# Patient Record
Sex: FEMALE | Race: ASIAN | NOT HISPANIC OR LATINO | URBAN - METROPOLITAN AREA
[De-identification: names, ages, dates, MRNs, and addresses within clinical notes are randomized per-mention and may not be internally consistent; named-entity substitution may affect disease eponyms.]

---

## 2022-05-05 ENCOUNTER — WEB ENCOUNTER (OUTPATIENT)
Dept: URBAN - METROPOLITAN AREA CLINIC 128 | Facility: CLINIC | Age: 67
End: 2022-05-05

## 2022-05-05 ENCOUNTER — LAB OUTSIDE AN ENCOUNTER (OUTPATIENT)
Dept: URBAN - METROPOLITAN AREA CLINIC 19 | Facility: CLINIC | Age: 67
End: 2022-05-05

## 2022-05-05 ENCOUNTER — DASHBOARD ENCOUNTERS (OUTPATIENT)
Age: 67
End: 2022-05-05

## 2022-05-05 ENCOUNTER — OFFICE VISIT (OUTPATIENT)
Dept: URBAN - METROPOLITAN AREA CLINIC 128 | Facility: CLINIC | Age: 67
End: 2022-05-05
Payer: MEDICARE

## 2022-05-05 DIAGNOSIS — R74.8 ELEVATED ALKALINE PHOSPHATASE LEVEL: ICD-10-CM

## 2022-05-05 DIAGNOSIS — Z86.010 HISTORY OF COLON POLYPS: ICD-10-CM

## 2022-05-05 DIAGNOSIS — R74.01 ELEVATED ALT MEASUREMENT: ICD-10-CM

## 2022-05-05 PROBLEM — 274770006 ALKALINE PHOSPHATASE RAISED: Status: ACTIVE | Noted: 2022-05-05

## 2022-05-05 PROBLEM — 166669000: Status: ACTIVE | Noted: 2022-05-05

## 2022-05-05 PROCEDURE — 99243 OFF/OP CNSLTJ NEW/EST LOW 30: CPT | Performed by: INTERNAL MEDICINE

## 2022-05-05 RX ORDER — ASPIRIN 81 MG/1
1 TABLET TABLET, COATED ORAL ONCE A DAY
Status: ACTIVE | COMMUNITY

## 2022-05-05 RX ORDER — MULTIVITAMIN
1 TABLET TABLET ORAL ONCE A DAY
Status: ACTIVE | COMMUNITY

## 2022-05-05 RX ORDER — SODIUM, POTASSIUM,MAG SULFATES 17.5-3.13G
254 ML SOLUTION, RECONSTITUTED, ORAL ORAL ONCE
Qty: 1 KIT | Refills: 0 | OUTPATIENT
Start: 2022-05-05 | End: 2022-05-06

## 2022-05-05 RX ORDER — ZALEPLON 10 MG/1
TAKE 1 CAPSULE (10 MG) BY ORAL ROUTE ONCE DAILY AT BEDTIME CAPSULE ORAL 1
Qty: 0 | Refills: 0 | Status: ACTIVE | COMMUNITY
Start: 1900-01-01

## 2022-05-05 RX ORDER — ROSUVASTATIN CALCIUM 10 MG/1
1 TABLET TABLET, FILM COATED ORAL ONCE A DAY
Status: ACTIVE | COMMUNITY

## 2022-05-05 RX ORDER — ATORVASTATIN CALCIUM 10 MG/1
TABLET, FILM COATED ORAL
Qty: 0 | Refills: 0 | Status: ON HOLD | COMMUNITY
Start: 1900-01-01

## 2022-05-05 RX ORDER — MELOXICAM 15 MG/1
1 TABLET TABLET ORAL ONCE A DAY
Status: ACTIVE | COMMUNITY

## 2022-05-05 RX ORDER — EPINEPHRINE 0.3 MG/.3ML
AS DIRECTED INJECTION INTRAMUSCULAR; SUBCUTANEOUS
Status: ACTIVE | COMMUNITY

## 2022-05-05 NOTE — HPI-TODAY'S VISIT:
Patient presents primarily as a referral from her PCP to schedule her surveillance colonoscopy.  A copy of this note will be sent to the referring provider.  I performed her colonoscopy on 3/15/17 and found two adenomatous polyps - due for surveillance at this time.  However, the patient also has slight elevation in LFTs based on labs drawn on 3/8/22.  Her ALT was 45, and her AP was 125.  She has no signs of chronic liver disease.  Statin was recently changed to Crestor.  Will rule out any chronic liver disease.

## 2022-05-09 ENCOUNTER — LAB OUTSIDE AN ENCOUNTER (OUTPATIENT)
Dept: URBAN - METROPOLITAN AREA CLINIC 19 | Facility: CLINIC | Age: 67
End: 2022-05-09

## 2022-05-10 LAB
IMMUNOGLOBULIN G, QN, SERUM: 1047
WRITTEN AUTHORIZATION: (no result)

## 2022-05-11 ENCOUNTER — TELEPHONE ENCOUNTER (OUTPATIENT)
Dept: URBAN - METROPOLITAN AREA CLINIC 128 | Facility: CLINIC | Age: 67
End: 2022-05-11

## 2022-05-11 LAB
A/G RATIO: 2
ACTIN (SMOOTH MUSCLE) ANTIBODY: 9
ALBUMIN: 4.7
ALKALINE PHOSPHATASE: 113
ALT (SGPT): 48
ANA DIRECT: NEGATIVE
AST (SGOT): 55
BILIRUBIN, TOTAL: 0.5
BUN/CREATININE RATIO: 38
BUN: 21
CALCIUM: 9.7
CARBON DIOXIDE, TOTAL: 24
CHLORIDE: 105
CREATININE: 0.56
EGFR: 101
GLOBULIN, TOTAL: 2.4
GLUCOSE: 111
HBSAG SCREEN: NEGATIVE
HEMATOCRIT: 41.4
HEMOGLOBIN: 12.9
HEP A AB, IGM: NEGATIVE
HEP A AB, TOTAL: POSITIVE
HEP B CORE AB, IGM: NEGATIVE
HEP C VIRUS AB: <0.1
HEPATITIS B SURF AB QUANT: 75.7
IGG, SUBCLASS 4: 23
LIVER-KIDNEY MICROSOMAL AB: 0.7
MCH: 32.3
MCHC: 31.2
MCV: 104
MITOCHONDRIAL (M2) ANTIBODY: <20
NRBC: (no result)
PLATELETS: 251
POTASSIUM: 4.6
PROTEIN, TOTAL: 7.1
RBC: 3.99
RDW: 12.5
SODIUM: 143
WBC: 6.4

## 2022-07-05 ENCOUNTER — OFFICE VISIT (OUTPATIENT)
Dept: URBAN - METROPOLITAN AREA SURGERY CENTER 31 | Facility: SURGERY CENTER | Age: 67
End: 2022-07-05
Payer: MEDICARE

## 2022-07-05 DIAGNOSIS — Z86.010 ADENOMAS PERSONAL HISTORY OF COLONIC POLYPS: ICD-10-CM

## 2022-07-05 PROCEDURE — G8907 PT DOC NO EVENTS ON DISCHARG: HCPCS | Performed by: INTERNAL MEDICINE

## 2022-07-05 PROCEDURE — G0105 COLORECTAL SCRN; HI RISK IND: HCPCS | Performed by: INTERNAL MEDICINE

## 2022-07-05 RX ORDER — ATORVASTATIN CALCIUM 10 MG/1
TABLET, FILM COATED ORAL
Qty: 0 | Refills: 0 | Status: ON HOLD | COMMUNITY
Start: 1900-01-01

## 2022-07-05 RX ORDER — MULTIVITAMIN
1 TABLET TABLET ORAL ONCE A DAY
Status: ACTIVE | COMMUNITY

## 2022-07-05 RX ORDER — ROSUVASTATIN CALCIUM 10 MG/1
1 TABLET TABLET, FILM COATED ORAL ONCE A DAY
Status: ACTIVE | COMMUNITY

## 2022-07-05 RX ORDER — ZALEPLON 10 MG/1
TAKE 1 CAPSULE (10 MG) BY ORAL ROUTE ONCE DAILY AT BEDTIME CAPSULE ORAL 1
Qty: 0 | Refills: 0 | Status: ACTIVE | COMMUNITY
Start: 1900-01-01

## 2022-07-05 RX ORDER — MELOXICAM 15 MG/1
1 TABLET TABLET ORAL ONCE A DAY
Status: ACTIVE | COMMUNITY

## 2022-07-05 RX ORDER — EPINEPHRINE 0.3 MG/.3ML
AS DIRECTED INJECTION INTRAMUSCULAR; SUBCUTANEOUS
Status: ACTIVE | COMMUNITY

## 2022-07-05 RX ORDER — ASPIRIN 81 MG/1
1 TABLET TABLET, COATED ORAL ONCE A DAY
Status: ACTIVE | COMMUNITY

## 2022-11-03 ENCOUNTER — OFFICE VISIT (OUTPATIENT)
Dept: URBAN - METROPOLITAN AREA CLINIC 128 | Facility: CLINIC | Age: 67
End: 2022-11-03